# Patient Record
Sex: MALE | Race: WHITE | Employment: UNEMPLOYED | RURAL
[De-identification: names, ages, dates, MRNs, and addresses within clinical notes are randomized per-mention and may not be internally consistent; named-entity substitution may affect disease eponyms.]

---

## 2020-11-14 ENCOUNTER — APPOINTMENT (OUTPATIENT)
Dept: GENERAL RADIOLOGY | Facility: HOSPITAL | Age: 50
End: 2020-11-14
Payer: COMMERCIAL

## 2020-11-14 ENCOUNTER — HOSPITAL ENCOUNTER (EMERGENCY)
Facility: HOSPITAL | Age: 50
Discharge: ANOTHER HOSPITAL | End: 2020-11-14
Attending: HOSPITALIST
Payer: COMMERCIAL

## 2020-11-14 ENCOUNTER — APPOINTMENT (OUTPATIENT)
Dept: CT IMAGING | Facility: HOSPITAL | Age: 50
End: 2020-11-14
Payer: COMMERCIAL

## 2020-11-14 VITALS
OXYGEN SATURATION: 85 % | TEMPERATURE: 97.8 F | DIASTOLIC BLOOD PRESSURE: 72 MMHG | WEIGHT: 275 LBS | HEIGHT: 71 IN | BODY MASS INDEX: 38.5 KG/M2 | RESPIRATION RATE: 18 BRPM | HEART RATE: 107 BPM | SYSTOLIC BLOOD PRESSURE: 129 MMHG

## 2020-11-14 LAB
A/G RATIO: 1 (ref 0.8–2)
ALBUMIN SERPL-MCNC: 3.6 G/DL (ref 3.4–4.8)
ALP BLD-CCNC: 103 U/L (ref 25–100)
ALT SERPL-CCNC: 42 U/L (ref 4–36)
ANION GAP SERPL CALCULATED.3IONS-SCNC: 19 MMOL/L (ref 3–16)
AST SERPL-CCNC: 83 U/L (ref 8–33)
BASE EXCESS ARTERIAL: -5.7 MMOL/L (ref -3–3)
BASOPHILS ABSOLUTE: 0 K/UL (ref 0–0.1)
BASOPHILS RELATIVE PERCENT: 0.2 %
BILIRUB SERPL-MCNC: 0.9 MG/DL (ref 0.3–1.2)
BUN BLDV-MCNC: 20 MG/DL (ref 6–20)
CALCIUM SERPL-MCNC: 8.7 MG/DL (ref 8.5–10.5)
CHLORIDE BLD-SCNC: 97 MMOL/L (ref 98–107)
CO2: 18 MMOL/L (ref 20–30)
CREAT SERPL-MCNC: 1.1 MG/DL (ref 0.4–1.2)
D DIMER: 1.78 UG/ML FEU (ref 0–0.6)
EOSINOPHILS ABSOLUTE: 0 K/UL (ref 0–0.4)
EOSINOPHILS RELATIVE PERCENT: 0 %
FIO2: 0.99 %
GFR AFRICAN AMERICAN: >59
GFR NON-AFRICAN AMERICAN: >59
GLOBULIN: 3.6 G/DL
GLUCOSE BLD-MCNC: 399 MG/DL (ref 74–106)
HCO3 ARTERIAL: 17 MMOL/L (ref 22–26)
HCT VFR BLD CALC: 44.6 % (ref 40–54)
HEMOGLOBIN: 14.7 G/DL (ref 13–18)
IMMATURE GRANULOCYTES #: 0.1 K/UL
IMMATURE GRANULOCYTES %: 1.5 % (ref 0–5)
LACTIC ACID: 3.3 MMOL/L (ref 0.4–2)
LYMPHOCYTES ABSOLUTE: 0.5 K/UL (ref 1.5–4)
LYMPHOCYTES RELATIVE PERCENT: 6.1 %
MCH RBC QN AUTO: 30.1 PG (ref 27–32)
MCHC RBC AUTO-ENTMCNC: 33 G/DL (ref 31–35)
MCV RBC AUTO: 91.4 FL (ref 80–100)
MONOCYTES ABSOLUTE: 0.6 K/UL (ref 0.2–0.8)
MONOCYTES RELATIVE PERCENT: 6.8 %
NEUTROPHILS ABSOLUTE: 7.4 K/UL (ref 2–7.5)
NEUTROPHILS RELATIVE PERCENT: 85.4 %
O2 SAT, ARTERIAL: 83.5 %
O2 THERAPY: ABNORMAL
PCO2 ARTERIAL: 26 MMHG (ref 35–45)
PDW BLD-RTO: 13.2 % (ref 11–16)
PH ARTERIAL: 7.43 (ref 7.35–7.45)
PLATELET # BLD: 197 K/UL (ref 150–400)
PMV BLD AUTO: 11.5 FL (ref 6–10)
PO2 ARTERIAL: 49.5 MMHG (ref 80–100)
POTASSIUM REFLEX MAGNESIUM: 4.9 MMOL/L (ref 3.4–5.1)
PRO-BNP: 775 PG/ML (ref 0–1800)
RAPID INFLUENZA  B AGN: NEGATIVE
RAPID INFLUENZA A AGN: NEGATIVE
RBC # BLD: 4.88 M/UL (ref 4.5–6)
SARS-COV-2, NAAT: DETECTED
SODIUM BLD-SCNC: 134 MMOL/L (ref 136–145)
TCO2 ARTERIAL: 17.8 MMOL/L (ref 24–30)
TOTAL PROTEIN: 7.2 G/DL (ref 6.4–8.3)
TROPONIN: <0.3 NG/ML
WBC # BLD: 8.6 K/UL (ref 4–11)

## 2020-11-14 PROCEDURE — 71045 X-RAY EXAM CHEST 1 VIEW: CPT

## 2020-11-14 PROCEDURE — 2580000003 HC RX 258: Performed by: HOSPITALIST

## 2020-11-14 PROCEDURE — 99285 EMERGENCY DEPT VISIT HI MDM: CPT

## 2020-11-14 PROCEDURE — 80053 COMPREHEN METABOLIC PANEL: CPT

## 2020-11-14 PROCEDURE — 6360000002 HC RX W HCPCS

## 2020-11-14 PROCEDURE — 71275 CT ANGIOGRAPHY CHEST: CPT

## 2020-11-14 PROCEDURE — 87040 BLOOD CULTURE FOR BACTERIA: CPT

## 2020-11-14 PROCEDURE — 94660 CPAP INITIATION&MGMT: CPT

## 2020-11-14 PROCEDURE — 96368 THER/DIAG CONCURRENT INF: CPT

## 2020-11-14 PROCEDURE — 83605 ASSAY OF LACTIC ACID: CPT

## 2020-11-14 PROCEDURE — 82803 BLOOD GASES ANY COMBINATION: CPT

## 2020-11-14 PROCEDURE — U0002 COVID-19 LAB TEST NON-CDC: HCPCS

## 2020-11-14 PROCEDURE — 96366 THER/PROPH/DIAG IV INF ADDON: CPT

## 2020-11-14 PROCEDURE — 36600 WITHDRAWAL OF ARTERIAL BLOOD: CPT

## 2020-11-14 PROCEDURE — 96375 TX/PRO/DX INJ NEW DRUG ADDON: CPT

## 2020-11-14 PROCEDURE — 85379 FIBRIN DEGRADATION QUANT: CPT

## 2020-11-14 PROCEDURE — 36415 COLL VENOUS BLD VENIPUNCTURE: CPT

## 2020-11-14 PROCEDURE — 84484 ASSAY OF TROPONIN QUANT: CPT

## 2020-11-14 PROCEDURE — 6370000000 HC RX 637 (ALT 250 FOR IP): Performed by: HOSPITALIST

## 2020-11-14 PROCEDURE — 96365 THER/PROPH/DIAG IV INF INIT: CPT

## 2020-11-14 PROCEDURE — 6360000004 HC RX CONTRAST MEDICATION: Performed by: HOSPITALIST

## 2020-11-14 PROCEDURE — 2500000003 HC RX 250 WO HCPCS: Performed by: HOSPITALIST

## 2020-11-14 PROCEDURE — 96376 TX/PRO/DX INJ SAME DRUG ADON: CPT

## 2020-11-14 PROCEDURE — 93005 ELECTROCARDIOGRAM TRACING: CPT

## 2020-11-14 PROCEDURE — 6360000002 HC RX W HCPCS: Performed by: HOSPITALIST

## 2020-11-14 PROCEDURE — 83880 ASSAY OF NATRIURETIC PEPTIDE: CPT

## 2020-11-14 PROCEDURE — 94640 AIRWAY INHALATION TREATMENT: CPT

## 2020-11-14 PROCEDURE — 85025 COMPLETE CBC W/AUTO DIFF WBC: CPT

## 2020-11-14 PROCEDURE — 96367 TX/PROPH/DG ADDL SEQ IV INF: CPT

## 2020-11-14 PROCEDURE — 87804 INFLUENZA ASSAY W/OPTIC: CPT

## 2020-11-14 RX ORDER — SODIUM CHLORIDE 9 MG/ML
1000 INJECTION, SOLUTION INTRAVENOUS CONTINUOUS
Status: DISCONTINUED | OUTPATIENT
Start: 2020-11-14 | End: 2020-11-14 | Stop reason: HOSPADM

## 2020-11-14 RX ORDER — GLIPIZIDE 5 MG/1
5 TABLET ORAL
COMMUNITY

## 2020-11-14 RX ORDER — ETOMIDATE 2 MG/ML
30 INJECTION INTRAVENOUS ONCE
Status: COMPLETED | OUTPATIENT
Start: 2020-11-14 | End: 2020-11-14

## 2020-11-14 RX ORDER — LEVOFLOXACIN 5 MG/ML
500 INJECTION, SOLUTION INTRAVENOUS ONCE
Status: COMPLETED | OUTPATIENT
Start: 2020-11-14 | End: 2020-11-14

## 2020-11-14 RX ORDER — MIDAZOLAM HYDROCHLORIDE 1 MG/ML
2 INJECTION, SOLUTION INTRAMUSCULAR; INTRAVENOUS CONTINUOUS
Status: DISCONTINUED | OUTPATIENT
Start: 2020-11-14 | End: 2020-11-14 | Stop reason: HOSPADM

## 2020-11-14 RX ORDER — FENTANYL CITRATE 50 UG/ML
INJECTION, SOLUTION INTRAMUSCULAR; INTRAVENOUS
Status: COMPLETED
Start: 2020-11-14 | End: 2020-11-14

## 2020-11-14 RX ORDER — FUROSEMIDE 10 MG/ML
40 INJECTION INTRAMUSCULAR; INTRAVENOUS ONCE
Status: COMPLETED | OUTPATIENT
Start: 2020-11-14 | End: 2020-11-14

## 2020-11-14 RX ORDER — IPRATROPIUM BROMIDE AND ALBUTEROL SULFATE 2.5; .5 MG/3ML; MG/3ML
1 SOLUTION RESPIRATORY (INHALATION) ONCE
Status: COMPLETED | OUTPATIENT
Start: 2020-11-14 | End: 2020-11-14

## 2020-11-14 RX ORDER — PROPOFOL 10 MG/ML
10 INJECTION, EMULSION INTRAVENOUS
Status: DISCONTINUED | OUTPATIENT
Start: 2020-11-14 | End: 2020-11-14 | Stop reason: HOSPADM

## 2020-11-14 RX ORDER — SUCCINYLCHOLINE CHLORIDE 20 MG/ML
INJECTION INTRAMUSCULAR; INTRAVENOUS
Status: DISCONTINUED
Start: 2020-11-14 | End: 2020-11-14 | Stop reason: HOSPADM

## 2020-11-14 RX ORDER — AMLODIPINE BESYLATE 10 MG/1
10 TABLET ORAL DAILY
COMMUNITY

## 2020-11-14 RX ORDER — DEXAMETHASONE SODIUM PHOSPHATE 10 MG/ML
10 INJECTION, SOLUTION INTRAMUSCULAR; INTRAVENOUS ONCE
Status: COMPLETED | OUTPATIENT
Start: 2020-11-14 | End: 2020-11-14

## 2020-11-14 RX ORDER — ROCURONIUM BROMIDE 10 MG/ML
0.6 INJECTION, SOLUTION INTRAVENOUS ONCE
Status: COMPLETED | OUTPATIENT
Start: 2020-11-14 | End: 2020-11-14

## 2020-11-14 RX ORDER — PROPOFOL 10 MG/ML
INJECTION, EMULSION INTRAVENOUS
Status: DISCONTINUED
Start: 2020-11-14 | End: 2020-11-14 | Stop reason: HOSPADM

## 2020-11-14 RX ORDER — ATORVASTATIN CALCIUM 20 MG/1
20 TABLET, FILM COATED ORAL DAILY
COMMUNITY

## 2020-11-14 RX ORDER — 0.9 % SODIUM CHLORIDE 0.9 %
1000 INTRAVENOUS SOLUTION INTRAVENOUS ONCE
Status: COMPLETED | OUTPATIENT
Start: 2020-11-14 | End: 2020-11-14

## 2020-11-14 RX ORDER — TRIAMCINOLONE ACETONIDE 0.25 MG/G
OINTMENT TOPICAL 2 TIMES DAILY
COMMUNITY

## 2020-11-14 RX ORDER — LOSARTAN POTASSIUM AND HYDROCHLOROTHIAZIDE 12.5; 1 MG/1; MG/1
1 TABLET ORAL DAILY
COMMUNITY

## 2020-11-14 RX ORDER — MIDAZOLAM HYDROCHLORIDE 1 MG/ML
1 INJECTION INTRAMUSCULAR; INTRAVENOUS CONTINUOUS
Status: DISCONTINUED | OUTPATIENT
Start: 2020-11-14 | End: 2020-11-14 | Stop reason: CLARIF

## 2020-11-14 RX ORDER — MIDAZOLAM HYDROCHLORIDE 1 MG/ML
2 INJECTION INTRAMUSCULAR; INTRAVENOUS ONCE
Status: COMPLETED | OUTPATIENT
Start: 2020-11-14 | End: 2020-11-14

## 2020-11-14 RX ADMIN — SODIUM CHLORIDE 1000 ML: 9 INJECTION, SOLUTION INTRAVENOUS at 11:05

## 2020-11-14 RX ADMIN — MIDAZOLAM 5 MG: 1 INJECTION INTRAMUSCULAR; INTRAVENOUS at 15:00

## 2020-11-14 RX ADMIN — SODIUM CHLORIDE 1000 ML: 9 INJECTION, SOLUTION INTRAVENOUS at 13:09

## 2020-11-14 RX ADMIN — VANCOMYCIN HYDROCHLORIDE 1000 MG: 1 INJECTION, POWDER, LYOPHILIZED, FOR SOLUTION INTRAVENOUS at 13:13

## 2020-11-14 RX ADMIN — FENTANYL CITRATE 100 MCG: 50 INJECTION INTRAMUSCULAR; INTRAVENOUS at 15:44

## 2020-11-14 RX ADMIN — IPRATROPIUM BROMIDE AND ALBUTEROL SULFATE 1 AMPULE: .5; 3 SOLUTION RESPIRATORY (INHALATION) at 10:58

## 2020-11-14 RX ADMIN — MIDAZOLAM 2 MG: 1 INJECTION INTRAMUSCULAR; INTRAVENOUS at 14:56

## 2020-11-14 RX ADMIN — PIPERACILLIN AND TAZOBACTAM 3.38 G: 3; .375 INJECTION, POWDER, LYOPHILIZED, FOR SOLUTION INTRAVENOUS at 11:08

## 2020-11-14 RX ADMIN — FENTANYL CITRATE 100 MCG: 50 INJECTION INTRAMUSCULAR; INTRAVENOUS at 15:04

## 2020-11-14 RX ADMIN — ETOMIDATE 30 MG: 20 INJECTION, SOLUTION INTRAVENOUS at 14:56

## 2020-11-14 RX ADMIN — MIDAZOLAM 2 MG: 1 INJECTION INTRAMUSCULAR; INTRAVENOUS at 14:57

## 2020-11-14 RX ADMIN — MIDAZOLAM 5 MG: 1 INJECTION INTRAMUSCULAR; INTRAVENOUS at 15:16

## 2020-11-14 RX ADMIN — MIDAZOLAM 5 MG/HR: 1 INJECTION INTRAMUSCULAR; INTRAVENOUS at 15:05

## 2020-11-14 RX ADMIN — ROCURONIUM BROMIDE 75 MG: 100 INJECTION INTRAVENOUS at 15:58

## 2020-11-14 RX ADMIN — FUROSEMIDE 40 MG: 10 INJECTION, SOLUTION INTRAMUSCULAR; INTRAVENOUS at 15:12

## 2020-11-14 RX ADMIN — PROPOFOL 10 MCG/KG/MIN: 10 INJECTION, EMULSION INTRAVENOUS at 15:47

## 2020-11-14 RX ADMIN — IOPAMIDOL 100 ML: 755 INJECTION, SOLUTION INTRAVENOUS at 12:43

## 2020-11-14 RX ADMIN — LEVOFLOXACIN 500 MG: 5 INJECTION, SOLUTION INTRAVENOUS at 11:07

## 2020-11-14 RX ADMIN — DEXAMETHASONE SODIUM PHOSPHATE 10 MG: 10 INJECTION, SOLUTION INTRAMUSCULAR; INTRAVENOUS at 11:06

## 2020-11-14 ASSESSMENT — PAIN SCALES - GENERAL
PAINLEVEL_OUTOF10: 0

## 2020-11-14 ASSESSMENT — PULMONARY FUNCTION TESTS: PIF_VALUE: 17

## 2020-11-14 NOTE — ED NOTES
Ems aware that [facility] is en route to transport from and to [address] [place] [address]  11/14/20 0607

## 2020-11-14 NOTE — ED NOTES
I did return call to Marc 24, [phone], [facility]  11/14/20 6777

## 2020-11-14 NOTE — ED NOTES
1555    [facility] 2 accepts   15 min eta plus lift    Radio to mario to clear helipad     Halima Saldaña RN  11/14/20 0557

## 2020-11-14 NOTE — ED NOTES
Per md, pt has been accepted to [facility] icu,dr murphy      Sanchez Josue, RN  11/14/20 0308

## 2020-11-14 NOTE — ED NOTES
No bed available at [facility] at this time.   [place] will access center to call all of [address], RN  11/14/20 4671

## 2020-11-14 NOTE — ED NOTES
Per md Dr Renate David will check to see if they have a bed avail    He would prefer that pt be intubated before he is sent there     Olga Boudreaux, MARTIN  11/14/20 5612

## 2020-11-14 NOTE — ED NOTES
Spo2 88 - 89%. RT called to bedside. Dr Meli Carney to room to update nurse and guards that ED will attempt to send patient to [facility] ED by air.       Jomar Pittman RN  11/14/20 9351

## 2020-11-14 NOTE — ED NOTES
Tg at [facility] called and requested that they fax a copy of patient's paperwork.      Lea Stuart RN  11/14/20 2918

## 2020-11-14 NOTE — PROGRESS NOTES
Pt initially on bipap tolerated ok then called for intubatipn a couple hours later. Non eventfull intubation pt slow to recover sats.   Transported to [facility] on finall settings of  rate 20 peep 22 100%    91%

## 2020-11-14 NOTE — ED TRIAGE NOTES
md at bedside at this time. Guards with pt. Pt sats 78% on nrb 15 L. RT called to bedside, possible bipap. Guards assisting pt into gown and continued restraints.

## 2020-11-14 NOTE — ED NOTES
Alana Gusman was advised by Shaun Dykes at [facility] that guards will need n95 provided by their employer. Guards advised. Call back to [facility] to see if they can give n95 to female guard that will be flying with pt.  [facility] will call me back re: if they an provide n95 or not. Call to mario to advise of situation.      Magno Alvarado RN  11/14/20 2079

## 2020-11-14 NOTE — ED NOTES
md did speak with pt intubation   Pt stated that he would rather be intubated now.   Paged RT to [address], RN  11/14/20 1178

## 2020-11-14 NOTE — ED NOTES
Per Dr. Nathen Keller request, [facility] Access to attempt transfer to  ICU.  Spoke with  Nav Moncada RN  She will call us back juan antonio White  11/14/20 9327 Wounds

## 2020-11-14 NOTE — ED NOTES
Called Steve at this time for possible flight of patient. Chepe Colon from [place] [facility] states that she will get in contact with the  and call us back. Chepe Colon at this time given direct number to ER for call back of whether or not [facility] will accept.       Mj Quiroz RN  11/14/20 6464

## 2020-11-14 NOTE — ED NOTES
alicia does not have pulmonary until Thursday and he will not be able to accept this pt at this time.   Shamar will advise [facility] center to try Ruben Leon RN  11/14/20 5382

## 2020-11-14 NOTE — ED NOTES
uk advised santa that pt will need n95 mas upon arrival.  We will give female guard small duck bill [facility]  11/14/20 8594

## 2020-11-14 NOTE — ED NOTES
md did speak with allie zamora  at the [facility] prior to pt transfer to advise her of pt condition and plan     Jian Paulino RN  11/14/20 0240

## 2020-11-14 NOTE — ED NOTES
Dr alexander speaking with dr Claudell Height [facility] at this time       Abraham Gonzales, RN  11/14/20 2383

## 2020-11-14 NOTE — ED NOTES
Patient report from Marylu Lloyd at [facility]. Patient complaining of SOA, spo2 in the 60's. Placed on 5L O2 and given duoneb. Spo2 up 78%, temp 97.8, RR 24, 151/81 and . NKDA. Patient 5/11' and 275#.      Nora Lenz RN  11/14/20 0584

## 2020-11-14 NOTE — ED PROVIDER NOTES
SOCIAL HISTORY       Social History     Socioeconomic History    Marital status: Single     Spouse name: None    Number of children: None    Years of education: None    Highest education level: None   Occupational History    None   Social Needs    Financial resource strain: None    Food insecurity     Worry: None     Inability: None    Transportation needs     Medical: None     Non-medical: None   Tobacco Use    Smoking status: Former Smoker    Smokeless tobacco: Former User   Substance and Sexual Activity    Alcohol use: Not Currently    Drug use: Not Currently    Sexual activity: None   Lifestyle    Physical activity     Days per week: None     Minutes per session: None    Stress: None   Relationships    Social connections     Talks on phone: None     Gets together: None     Attends Religion service: None     Active member of club or organization: None     Attends meetings of clubs or organizations: None     Relationship status: None    Intimate partner violence     Fear of current or ex partner: None     Emotionally abused: None     Physically abused: None     Forced sexual activity: None   Other Topics Concern    None   Social History Narrative    None         PHYSICAL EXAM    (up to 7 for level 4, 8 or more for level 5)     ED Triage Vitals   BP Temp Temp src Pulse Resp SpO2 Height Weight   -- -- -- -- -- -- -- --       Physical Exam  General :Patient is awake, alert, oriented  HEENT: Pupils are equally round and reactive to light, EOMI, conjunctivae clear. Oral mucosa is moist, no exudate. Uvula is midline  Neck: Neck is supple, full range of motion, trachea midline  Cardiac: Heart regular rate, rhythm, no murmurs, rubs, or gallops  Lungs: Decreased breath sounds bibasilarly to about mid lung bilaterally. No obvious rhonchi or wheezing noted. No discernible crackle. There is no use of accessory muscles. Chest wall:  There is no tenderness to palpation over the chest wall or over hospital encounter of 11/14/20   Rapid Influenza A/B Antigens    Specimen: Nasopharyngeal   Result Value Ref Range    Rapid Influenza A Ag Negative Negative    Rapid Influenza B Ag Negative Negative   CBC Auto Differential   Result Value Ref Range    WBC 8.6 4.0 - 11.0 K/uL    RBC 4.88 4.50 - 6.00 M/uL    Hemoglobin 14.7 13.0 - 18.0 g/dL    Hematocrit 44.6 40.0 - 54.0 %    MCV 91.4 80.0 - 100.0 fL    MCH 30.1 27.0 - 32.0 pg    MCHC 33.0 31.0 - 35.0 g/dL    RDW 13.2 11.0 - 16.0 %    Platelets 540 601 - 790 K/uL    MPV 11.5 (H) 6.0 - 10.0 fL    Neutrophils % 85.4 %    Immature Granulocytes % 1.5 0.0 - 5.0 %    Lymphocytes % 6.1 %    Monocytes % 6.8 %    Eosinophils % 0.0 %    Basophils % 0.2 %    Neutrophils Absolute 7.4 2.0 - 7.5 K/uL    Immature Granulocytes # 0.1 K/uL    Lymphocytes Absolute 0.5 (L) 1.5 - 4.0 K/uL    Monocytes Absolute 0.6 0.2 - 0.8 K/uL    Eosinophils Absolute 0.0 0.0 - 0.4 K/uL    Basophils Absolute 0.0 0.0 - 0.1 K/uL   Comprehensive Metabolic Panel w/ Reflex to MG   Result Value Ref Range    Sodium 134 (L) 136 - 145 mmol/L    Potassium reflex Magnesium 4.9 3.4 - 5.1 mmol/L    Chloride 97 (L) 98 - 107 mmol/L    CO2 18 (L) 20 - 30 mmol/L    Anion Gap 19 (H) 3 - 16    Glucose 399 (H) 74 - 106 mg/dL    BUN 20 6 - 20 mg/dL    CREATININE 1.1 0.4 - 1.2 mg/dL    GFR Non-African American >59 >59    GFR African American >59 >59    Calcium 8.7 8.5 - 10.5 mg/dL    Total Protein 7.2 6.4 - 8.3 g/dL    Alb 3.6 3.4 - 4.8 g/dL    Albumin/Globulin Ratio 1.0 0.8 - 2.0    Total Bilirubin 0.9 0.3 - 1.2 mg/dL    Alkaline Phosphatase 103 (H) 25 - 100 U/L    ALT 42 (H) 4 - 36 U/L    AST 83 (H) 8 - 33 U/L    Globulin 3.6 g/dL   Troponin   Result Value Ref Range    Troponin <0.30 <0.30 ng/mL   Brain Natriuretic Peptide   Result Value Ref Range    Pro- 0 - 1,800 pg/mL   D-Dimer, Quantitative   Result Value Ref Range    D-Dimer, Quant 1.78 (HH) 0.00 - 0.60 ug/mL FEU   Lactic Acid, Plasma   Result Value Ref Range Lactic Acid 3.3 (H) 0.4 - 2.0 mmol/L   Blood Gas, Arterial   Result Value Ref Range    pH, Arterial 7.432 7.350 - 7.450    pCO2, Arterial 26.0 (L) 35.0 - 45.0 mmHg    pO2, Arterial 49.5 (LL) 80.0 - 100.0 mmHg    HCO3, Arterial 17.0 (L) 22.0 - 26.0 mmol/L    Base Excess, Arterial -5.7 (L) -3.0 - 3.0 mmol/L    O2 Sat, Arterial 83.5 (LL) >92 %    TCO2, Arterial 17.8 (L) 24.0 - 30.0 mmol/L    O2 Therapy Unknown     FIO2 0.99 Not Established %   COVID-19   Result Value Ref Range    SARS-CoV-2, NAAT DETECTED (AA) Not Detected        All other labs were within normal range or not returned as of this dictation.     EMERGENCY DEPARTMENT COURSE and DIFFERENTIAL DIAGNOSIS/MDM:   Vitals:    Vitals:    11/14/20 1500 11/14/20 1515 11/14/20 1521 11/14/20 1530   BP: (!) 152/75 (!) 141/104 (!) 141/104 136/75   Pulse: 121 124 103 97   Resp:   (!) 37    Temp:       TempSrc:       SpO2:   97%    Weight:       Height:           MEDICATIONS ADMINISTERED IN ED:  Medications   0.9 % sodium chloride infusion (1,000 mLs Intravenous New Bag 11/14/20 1309)   midazolam (VERSED) 50 mg in 50 mL infusion (5 mg/hr Intravenous New Bag 11/14/20 1505)   succinylcholine (ANECTINE) 20 MG/ML injection (0 mg  Held 11/14/20 1524)   propofol 1000 MG/100ML injection (  Held 11/14/20 1534)   fentaNYL (SUBLIMAZE) 100 MCG/2ML injection (has no administration in time range)   0.9 % sodium chloride bolus (0 mLs Intravenous Stopped 11/14/20 1205)   dexamethasone (PF) (DECADRON) injection 10 mg (10 mg Intravenous Given 11/14/20 1106)   ipratropium-albuterol (DUONEB) nebulizer solution 1 ampule (1 ampule Inhalation Given 11/14/20 1058)   piperacillin-tazobactam (ZOSYN) 3.375 g in dextrose 5 % 50 mL IVPB (mini-bag) (0 g Intravenous Stopped 11/14/20 1140)   vancomycin 1000 mg IVPB in 250 mL D5W addavial (0 mg Intravenous Stopped 11/14/20 1415)   levoFLOXacin (LEVAQUIN) 500 MG/100ML infusion 500 mg (0 mg Intravenous Stopped 11/14/20 1210)   iopamidol (ISOVUE-370) 76 % injection 100 mL (100 mLs Intravenous Given 11/14/20 1243)   etomidate (AMIDATE) injection 30 mg (30 mg Intravenous Given 11/14/20 1456)   midazolam (VERSED) injection 2 mg (5 mg Intravenous Given 11/14/20 1516)   fentaNYL (SUBLIMAZE) 100 MCG/2ML injection (100 mcg  Given by Other 11/14/20 1504)   furosemide (LASIX) injection 40 mg (40 mg Intravenous Given 11/14/20 1512)       Procedure Note - Intubation: The benefits, risks, and alternatives of intubation were discussed with the patient and Verbal consent was obtained (or implied if emergent situation dictated) for the procedure. Pre-oxygenation was administered and the appropriate equipment and staff were made available at the bedside. Li Escalante was sedated/paralyzed; please see the chart for the drugs and dosages administered. A Chanel 4 laryngoscope blade was used for a grade 1 view and a 7.5mm endotracheal tube was viewed to pass through the cords on the first attempt. The tube was secured at 24cm to the lip. Tracheal position was confirmed using a colorimetric end-tidal CO2 detector, tube condensation and chest auscultation/visualization. Respiratory therapy is at the bedside and is assisting with ventilatory management. After initial evaluation and examination I did have a conversation with the patient about the upcoming plan, treatment possible disposition which they were agreeable to the times dictation. Patient given fluid bolus normal saline 1 L. We will go ahead and give him Decadron 10 mg IV since he is high risk for Covid and does present with Covid symptoms. Patient pulse ox was 78% on nonrebreather at 10 L. We will go ahead and place him either on BiPAP or CPAP to see if we can get better oxygenation for the patient. He will also have ABG performed. While the patient is on close circuit with the BiPAP or CPAP machine he will also receive DuoNeb. Patient will have portable chest radiograph performed.   Patient will also have rapid influenza and COVID-19 test performed. We will also check a CBC, CMP, troponin, BNP, D-dimer, lactic acid, blood cultures x2 and twelve-lead EKG. Patient's final disposition will be determined once his radiological diagnostic studies been performed reviewed however he does automatically meet criteria for acute hypoxic respiratory failure upon arrival and most likely will require hospitalization. Differential diagnosis for this patient could be acute pulmonary embolism, pneumonia, heart failure, COVID-19 pneumonia versus any other atypical type pneumonia and influenza list is not all inclusive. Influenza swab was negative    COVID-19 swab was positive    Blood work showed white counts 8600, hemoglobin is 14.7, hematocrit 44.6, platelet counts 904. Lactic acid elevated at 3.3. Troponin was nondetectable less than 0.30. proBNP was negative at 775. D-dimer was elevated at 1.78. Comprehensive metabolic panel was benign except for sodium 134 slightly low, chloride of 97 slightly low, CO2 18 slightly low, glucose of 399 elevated, along with alkaline phosphatase 103 mildly elevated, ALT of 42 also mildly elevated, AST of 83 mildly elevated. ABG showed pH was 7.432, PCO2 was 26, PO2 was 48 9.5, bicarb was 17.0 and base excess was -5.7. Portable chest radiograph read by radiology as patchy bilateral pneumonia likely atypical viral pneumonia. Normal-appearing heart size. Patient's radiological diagnostic studies were discussed with him and the guards present with him and they do state understanding. After my personal review of the chest radiograph prior to radiological read it does look consistent with a multifocal pneumonia bilaterally and possible mild pulmonary edema. Patient be go ahead and started on Vanco Zosyn and Levaquin IV for coverage. With the patient's elevated D-dimer we will perform a CTA of the chest rule out pulmonary emboli.   This is also concerning for possible positive COVID-19 however the swab is not returned at this time especially with the presentation of the patient in respiratory distress in the evidence on his chest radiograph. CTA of the chest rule out pulmonary embolism read by radiology as no evidence of pulmonary embolism to the proximal segmental arteries. Diffuse bilateral airspace disease in a pattern that is suggestive atypical and viral pneumonia COVID-19 and influenza can have this appearance. Mild hilar and mediastinal lymphadenopathy likely reactive. Patient's findings were discussed with him and advised that we would need to transfer him to a facility of higher level care with intensive care capabilities. They do state her understanding of this. We will try to start local here with [facility] to see if there is a ICU bed available for a Covid positive patient. They do state her understanding of this. Case discussed with Dr. Deshaun Oconnell the hospitalist at [facility] and he advised that they did not have a pulmonologist available until Thursday and they would not be able to accept any cases to the hospital which might require intubation. Declined transfer to their facility secondary to not having pulmonology service available. We will now attempt to contact [facility] to see if they have an ICU bed available for this patient. Case discussed with Dr. Julisa Lewis and he stated that he had to check to see if they could give the 1 ICU bed they had available away to this patient or not. But he did recommend that the patient be intubated. I discussed this with the patient and instead of waiting to see because of his respiratory status the patient states he has been tiring out some that he would prefer just to be intubated at this time without even knowing if he has an ICU bed available at [facility] or not. Patient will be intubated secondary to his hypoxic respiratory failure and COVID-19 pneumonia.   [facility] call back and was unable to give the bed to this patient at this time. We will expand our scope finding an ICU bed for this patient into [place]. If we have no luck with any beds in [place] then we will try [facility] and or [facility]. Patient intubated and placed on Versed drip. We did give him 100 of fentanyl bolus also. Upon intubation the patient did have copious amount of secretions consistent with pulmonary edema, up into the tube. He has requiring inline suction. We will also give the patient 40 of Lasix. Patient was given several multiple doses of 2 to 5 mg of Versed to keep him calm until the Versed drip was able to keep him down and sedated. We are initially going to have to give him a dose of succinylcholine but the patient responded well to the sedation and did not require paralyzation. Case was discussed with Dr. Diana Watts at the [facility] and she did accept him in transfer there. Initially they did not think he was going to have a bed available until tomorrow however one became available and bedboard advised that the room number was [id number] provided phone number to call report. Patient will be transported via EMS to [address] [place] [address] further evaluation work-up for acute hypoxic respiratory failure, multifocal Covid pneumonia pulmonary edema. CRITICAL CARE:  The high probability of sudden, clinically significant deterioration in the patient's condition required the highest level of my preparedness to intervene urgently. The services I provided to this patient were to treat and/or prevent clinically significant deterioration that could result in: Continued or worsening respiratory failure since he did arrive in acute hypoxic respiratory failure, myocardial infarction, and/or death.   Services included the following: chart data review, reviewing nursing notes and/or old charts, documentation time, consultant collaboration regarding findings and treatment options, medication orders and management, direct patient care, re-evaluations, vital sign assessments and ordering, interpreting and reviewing diagnostic studies/lab tests. Aggregate critical care time was 60 minutes, which includes only time during which I was engaged in work directly related to the patient's care, as described above, whether at the bedside or elsewhere in the Emergency Department. It did not include time spent performing other reported procedures or the services of residents, students, nurses or physician assistants. CONSULTS:  None    PROCEDURES:  Procedures    CRITICAL CARE TIME    Total Critical Care time was 0 minutes, excluding separately reportable procedures. There was a high probability of clinically significant/life threatening deterioration in the patient's condition which required my urgent intervention. FINAL IMPRESSION      1. Pneumonia due to COVID-19 virus    2. Acute respiratory failure with hypoxia (HCC)    3. Acute pulmonary edema (HCC)          DISPOSITION/PLAN   DISPOSITION        PATIENT REFERRED TO:  No follow-up provider specified. DISCHARGE MEDICATIONS:  New Prescriptions    No medications on file       Comment: Please note this report has been produced using speech recognition software and may contain errorsrelated to that system including errors in grammar, punctuation, and spelling, as well as words and phrases that may be inappropriate. If there are any questions or concerns please feel free to contact the dictating providerfor clarification.     Reuben Roberts DO  Attending Emergency Physician              Reuben Roberts DO  11/14/20 5369

## 2020-11-19 LAB
BLOOD CULTURE, ROUTINE: NORMAL
CULTURE, BLOOD 2: NORMAL